# Patient Record
Sex: MALE
[De-identification: names, ages, dates, MRNs, and addresses within clinical notes are randomized per-mention and may not be internally consistent; named-entity substitution may affect disease eponyms.]

---

## 2022-07-30 ENCOUNTER — TELEPHONE ENCOUNTER (OUTPATIENT)
Age: 47
End: 2022-07-30

## 2022-07-31 ENCOUNTER — TELEPHONE ENCOUNTER (OUTPATIENT)
Age: 47
End: 2022-07-31

## 2022-12-01 ENCOUNTER — TELEPHONE ENCOUNTER (OUTPATIENT)
Dept: URBAN - METROPOLITAN AREA CLINIC 7 | Facility: CLINIC | Age: 47
End: 2022-12-01

## 2022-12-28 ENCOUNTER — WEB ENCOUNTER (OUTPATIENT)
Dept: URBAN - METROPOLITAN AREA CLINIC 9 | Facility: CLINIC | Age: 47
End: 2022-12-28

## 2022-12-28 ENCOUNTER — CLAIMS CREATED FROM THE CLAIM WINDOW (OUTPATIENT)
Dept: URBAN - METROPOLITAN AREA CLINIC 9 | Facility: CLINIC | Age: 47
End: 2022-12-28
Payer: COMMERCIAL

## 2022-12-28 ENCOUNTER — DASHBOARD ENCOUNTERS (OUTPATIENT)
Age: 47
End: 2022-12-28

## 2022-12-28 VITALS
WEIGHT: 177 LBS | SYSTOLIC BLOOD PRESSURE: 128 MMHG | HEIGHT: 69 IN | DIASTOLIC BLOOD PRESSURE: 80 MMHG | BODY MASS INDEX: 26.22 KG/M2

## 2022-12-28 DIAGNOSIS — Z12.11 SCREENING FOR COLON CANCER: ICD-10-CM

## 2022-12-28 DIAGNOSIS — D64.9 ANEMIA, UNSPECIFIED TYPE: ICD-10-CM

## 2022-12-28 PROBLEM — 275978004 SCREENING FOR COLON CANCER: Status: ACTIVE | Noted: 2022-12-28

## 2022-12-28 PROBLEM — 271737000: Status: ACTIVE | Noted: 2022-12-28

## 2022-12-28 PROCEDURE — 99244 OFF/OP CNSLTJ NEW/EST MOD 40: CPT | Performed by: INTERNAL MEDICINE

## 2022-12-28 PROCEDURE — 99204 OFFICE O/P NEW MOD 45 MIN: CPT | Performed by: INTERNAL MEDICINE

## 2022-12-28 NOTE — HPI-TODAY'S VISIT:
Pt here for eval of anemia.  . Pt noted to have hgb of 11.  2022 fe studies neg . 2014 colon with bx neg . Pt is avg risk Denies any bleeding No restricted diets No prior surgeries . Etiology of anemia is unclear  . I advised we will plan on repeat CBC in 2 months, that we plan on folic acid and B12 given the normal iron and that we plan on his avg risk screening colon given he is over age 46.  .

## 2022-12-28 NOTE — PHYSICAL EXAM PSYCH:
normal mood with appropriate affect  , good eye contact, speech clear , normal mood with appropriate affect  , good eye contact, speech clear
Arthritis

## 2023-01-05 ENCOUNTER — CLAIMS CREATED FROM THE CLAIM WINDOW (OUTPATIENT)
Dept: URBAN - METROPOLITAN AREA SURGERY CENTER 9 | Facility: SURGERY CENTER | Age: 48
End: 2023-01-05
Payer: COMMERCIAL

## 2023-01-05 DIAGNOSIS — K64.0 BLEEDING GRADE I HEMORRHOIDS: ICD-10-CM

## 2023-01-05 DIAGNOSIS — Z12.11 COLON CANCER SCREENING: ICD-10-CM

## 2023-01-05 PROCEDURE — G0121 COLON CA SCRN NOT HI RSK IND: HCPCS | Performed by: INTERNAL MEDICINE

## 2023-03-16 LAB
BASO (ABSOLUTE): 0
BASOS: 1
EOS (ABSOLUTE): 0.1
EOS: 2
FOLATE (FOLIC ACID), SERUM: >20
HEMATOCRIT: 39.6
HEMATOLOGY COMMENTS:: (no result)
HEMOGLOBIN: 13.8
IMMATURE CELLS: (no result)
IMMATURE GRANS (ABS): 0
IMMATURE GRANULOCYTES: 1
LYMPHS (ABSOLUTE): 2.2
LYMPHS: 35
MCH: 31.3
MCHC: 34.8
MCV: 90
MONOCYTES(ABSOLUTE): 0.6
MONOCYTES: 10
NEUTROPHILS (ABSOLUTE): 3.3
NEUTROPHILS: 51
NRBC: (no result)
PLATELETS: 218
RBC: 4.41
RDW: 12.5
VITAMIN B12: 789
WBC: 6.3